# Patient Record
Sex: FEMALE | Race: WHITE | Employment: STUDENT | URBAN - METROPOLITAN AREA
[De-identification: names, ages, dates, MRNs, and addresses within clinical notes are randomized per-mention and may not be internally consistent; named-entity substitution may affect disease eponyms.]

---
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
## 2024-01-02 ENCOUNTER — OFFICE VISIT (OUTPATIENT)
Dept: FAMILY MEDICINE CLINIC | Facility: CLINIC | Age: 19
End: 2024-01-02
Payer: COMMERCIAL

## 2024-01-02 VITALS
HEIGHT: 64.75 IN | OXYGEN SATURATION: 98 % | RESPIRATION RATE: 16 BRPM | DIASTOLIC BLOOD PRESSURE: 74 MMHG | HEART RATE: 88 BPM | WEIGHT: 163 LBS | BODY MASS INDEX: 27.49 KG/M2 | SYSTOLIC BLOOD PRESSURE: 122 MMHG

## 2024-01-02 DIAGNOSIS — Z00.00 ANNUAL PHYSICAL EXAM: Primary | ICD-10-CM

## 2024-01-02 PROCEDURE — 3008F BODY MASS INDEX DOCD: CPT | Performed by: FAMILY MEDICINE

## 2024-01-02 PROCEDURE — 3078F DIAST BP <80 MM HG: CPT | Performed by: FAMILY MEDICINE

## 2024-01-02 PROCEDURE — 99395 PREV VISIT EST AGE 18-39: CPT | Performed by: FAMILY MEDICINE

## 2024-01-02 PROCEDURE — 3074F SYST BP LT 130 MM HG: CPT | Performed by: FAMILY MEDICINE

## 2024-12-02 ENCOUNTER — TELEPHONE (OUTPATIENT)
Dept: FAMILY MEDICINE CLINIC | Facility: CLINIC | Age: 19
End: 2024-12-02

## 2024-12-02 NOTE — TELEPHONE ENCOUNTER
Pt had appt scheduled 1/6/25 for physical but was cancelled by our office (LE not available). She is only in town until 1/17/25,  will you add her in?

## 2024-12-02 NOTE — TELEPHONE ENCOUNTER
Px appt was cancelled due to LE out of the office.  Only home until 1/17.  Can she be fit in before she goes back to school for a px

## (undated) NOTE — LETTER
Name:  Lexy Duckworth Year:  9th Grade Class: Student ID No.:   Address:  [address] [place] [address] Phone:  [phone] (home)  :  14year old   Name Relationship Lgl [address] Work Phone Home Phone Mobile Phone   1.  Gurdeep Stevenson 15. Does anyone in your family have hypertrophic cardiomyopathy, Marfan syndrome, arrhythmogenic right ventricular cardiomyopathy, long QT syndrome, short QT syndrome, Brugada syndrome, or catecholaminergic polymorphic ventricular tachycardia? No   15.  Ojeda 29. Have you ever had a head injury or concussion? No   35. Have you ever had a hit or blow to the head that caused confusion, prolonged headache, or memory problems? No   36. Do you have a history of seizure disorder? No   37.  Do you have headaches with e Vision: R            L            BOTH                MEDICAL NORMAL ABNORMAL FINDINGS   Appearance:  Marfan stigmata (kyphoscoliosis, high-arched palate, pectus excavatum,      arachnodactyly, arm span > height, hyperlaxity, myopia, MVP, aortic insufficie As a prerequisite to participation in [employer] athletic activities, we agree that I/our student will not use performance-enhancing substances as defined in the [facility] Performance-Enhancing Substance Testing Program Protocol.  We have reviewed the policy and under

## (undated) NOTE — LETTER
[facility] [employer] [place] [employer] of Child Health Examination       Student's Name  Nolberto Angel Birth Eliezer Title                           Date     Signature HEALTH HISTORY          TO BE COMPLETED AND SIGNED BY PARENT/GUARDIAN AND VERIFIED BY HEALTH CARE PROVIDER    ALLERGIES  (Food, drug, insect, other)  Patient has no known allergies.  MEDICATION  (List all prescribed or taken on a regular basis.)  No current 78   Temp 97.8 °F (36.6 °C) (Temporal)   Resp 16   Ht 5' 4.75\" (1.645 m)   Wt 149 lb   SpO2 100%   BMI 24.99 kg/m²     DIABETES SCREENING  BMI>85% age/sex  No And any two of the following:  Family History No    Ethnic Minority  No          Signs of Insuli Asthma: No Mental Health Yes        Currently Prescribed Asthma Medication:            Quick-relief  medication (e.g. Short Acting Beta Antagonist): No          Controller medication (e.g. inhaled corticosteroid):   No Other   NEEDS/MODIFICATIONS required

## (undated) NOTE — LETTER
Name:  Margo Stein Year:  8th grade  Class: Student ID No.:   Address:  [address] [place] [address] Phone:  [phone] (home)  :  14year old   Name Relationship Lgl [address] Work Phone Home Phone Mobile Phone   1.  CARMELINA JONES syndrome, short QT syndrome, Brugada syndrome, or catecholaminergic polymorphic ventricular tachycardia? No   15. Does anyone in your family have a heart problem, pacemaker, or implanted defibrillator? No   16.  Has anyone in your family had unexplained zhang 39. Do you have a history of seizure disorder? No   37. Do you have headaches with exercise? No   38. Have you ever had numbness, tingling, or weakness in your arms or legs after being hit or falling? No   39. Have you ever been unable to move your arms / l Appearance:  Marfan stigmata (kyphoscoliosis, high-arched palate, pectus excavatum,      arachnodactyly, arm span > height, hyperlaxity, myopia, MVP, aortic insufficiency) Yes    Eyes/Ears/Nose/Throat:    · Pupils equal  · Hearing Yes    Lymph nodes Yes that I/our student will not use performance-enhancing substances as defined in the [facility] Performance-Enhancing Substance Testing Program Protocol.  We have reviewed the policy and understand that I/our student may be asked to submit to testing for the presen

## (undated) NOTE — LETTER
Name:  Colt Anisa Year:  10th Grade Class: Student ID No.:   Address:  [address] [place] [address] Phone:  [phone] (home)  :  12year old   Name Relationship Lgl [address] Work Phone Home Phone Mobile Phone   1.  CARMELINA JONES 13. Does anyone in your family have a heart problem, pacemaker, or implanted defibrillator? 12. Has anyone in your family had unexplained fainting, seizures, or near drowning?      BONE AND JOINT QUESTIONS Yes No   17. Have you ever had an injury to a b after being hit or falling? 39.Have you ever been unable to move your arms / legs after being hit /fall? 40. Have you ever become ill while exercising in the heat?     41. Do you get frequent muscle cramps when exercising? 42.  Do you or someone Lymph nodes Yes    Heart*  · Murmurs (auscultation standing, supine, +/- Valsalva)  · Location of point of maximal impulse (PMI) Yes    Pulses Yes    Lungs Yes    Abdomen Yes    Genitourinary (males only)* N/A    Skin:  HSV, lesions suggestive of MRSA, tin Protocol.  We have reviewed the policy and understand that I/our student may be asked to submit to testing for the presence of performance-enhancing substances in my/his/her body either during [employer] state series events or during the school day, and I/our jens

## (undated) NOTE — LETTER
[facility] [employer] Child Health Examination       Student's Name  Maite Holding Birth Date Title                           Date     Signature HEALTH HISTORY          TO BE COMPLETED AND SIGNED BY PARENT/GUARDIAN AND VERIFIED BY HEALTH CARE PROVIDER    ALLERGIES  (Food, drug, insect, other)  Patient has no known allergies.  MEDICATION  (List all prescribed or taken on a regular basis.)    Current PHYSICAL EXAMINATION REQUIREMENTS    Entire section below to be completed by MD/DO/APN/PA       PHYSICAL EXAMINATION REQUIREMENTS (head circumference if <33 years old):   Resp 16   Ht 63\"   Wt 135 lb   BMI 23.91 kg/m²     DIABETES SCREENING  BMI>85% age/ Cardiovascular/HTN Yes  Nutritional status Yes    Respiratory Yes                   Diagnosis of Asthma: No Mental Health Yes        Currently Prescribed Asthma Medication:            Quick-relief  medication (e.g. Short Acting Beta Antagonist):  No